# Patient Record
Sex: FEMALE
[De-identification: names, ages, dates, MRNs, and addresses within clinical notes are randomized per-mention and may not be internally consistent; named-entity substitution may affect disease eponyms.]

---

## 2021-03-15 ENCOUNTER — NURSE TRIAGE (OUTPATIENT)
Dept: OTHER | Facility: CLINIC | Age: 61
End: 2021-03-15

## 2021-03-15 NOTE — TELEPHONE ENCOUNTER
Reason for Disposition   COVID-19 vaccine, questions about   COVID-19 vaccine, Frequently Asked Questions (FAQs)    Answer Assessment - Initial Assessment Questions  1. COVID-19 CLOSE CONTACT: \"Who is the person with the confirmed or suspected COVID-19 infection that you were exposed to?\"      Daughter and grandchild    2. PLACE of CONTACT: \"Where were you when you were exposed to COVID-19? \" (e.g., home, school, medical waiting room; which city?)      Home    3. TYPE of CONTACT: \"How much contact was there? \" (e.g., sitting next to, live in same house, work in same office, same building)      Live in same house    4. DURATION of CONTACT: \"How long were you in contact with the COVID-19 patient? \" (e.g., a few seconds, passed by person, a few minutes, 15 minutes or longer, live with the patient)      Lives with    5. MASK: \"Were you wearing a mask? \" \"Was the other person wearing a mask? \" Note: wearing a mask reduces the risk of an otherwise close contact. No    6. DATE of CONTACT: \"When did you have contact with a COVID-19 patient? \" (e.g., how many days ago)     3/12    7. COMMUNITY SPREAD: \"Are there lots of cases of COVID-19 (community spread) where you live? \" (See public health department website, if unsure)        NA    8. SYMPTOMS: \"Do you have any symptoms? \" (e.g., fever, cough, breathing difficulty, loss of taste or smell)      None    9. PREGNANCY OR POSTPARTUM: \"Is there any chance you are pregnant? \" \"When was your last menstrual period? \" \"Did you deliver in the last 2 weeks? \"      No    10. HIGH RISK: \"Do you have any heart or lung problems? Do you have a weak immune system? \" (e.g., heart failure, COPD, asthma, HIV positive, chemotherapy, renal failure, diabetes mellitus, sickle cell anemia, obesity)        Yes    11. TRAVEL: \"Have you traveled out of the country recently? \" If so, \"When and where? \" Also ask about out-of-state travel, since the CDC has identified some high-risk cities for community spread in the [place] Note: Travel becomes less relevant if there is widespread community transmission where the patient lives. NA    Protocols used: CORONAVIRUS (COVID-19) EXPOSURE-ADULT-, CORONAVIRUS (COVID-19) VACCINE QUESTIONS AND REACTIONS-ADULT-AH    Caller is asking if she was exposed to Covid can she still get the vaccine. Informed caller she can get vaccine as long as she is symptom free at time of vaccine.

## 2022-03-18 PROBLEM — K65.1 INTRA-ABDOMINAL ABSCESS (HCC): Status: ACTIVE | Noted: 2020-03-20

## 2022-03-19 PROBLEM — R19.02 ABDOMINAL MASS, LEFT UPPER QUADRANT: Status: ACTIVE | Noted: 2020-03-09

## 2022-03-20 PROBLEM — C55 UTERINE CANCER (HCC): Status: ACTIVE | Noted: 2020-03-09

## 2023-03-20 PROBLEM — N17.9 ACUTE RENAL FAILURE (ARF) (HCC): Status: ACTIVE | Noted: 2023-03-20

## 2023-03-25 PROBLEM — B96.20 E-COLI UTI: Status: ACTIVE | Noted: 2023-03-25

## 2023-03-25 PROBLEM — N39.0 E-COLI UTI: Status: ACTIVE | Noted: 2023-03-25

## 2023-03-25 PROBLEM — N17.9 ACUTE RENAL FAILURE (ARF) (HCC): Status: RESOLVED | Noted: 2023-03-20 | Resolved: 2023-03-25

## 2023-06-13 PROBLEM — N10 ACUTE PYELONEPHRITIS: Status: ACTIVE | Noted: 2023-06-13

## 2023-06-21 PROBLEM — E11.9 DIABETES (HCC): Status: ACTIVE | Noted: 2023-06-21

## 2023-06-21 PROBLEM — I10 HYPERTENSION: Status: ACTIVE | Noted: 2023-06-21

## 2023-07-14 PROBLEM — E11.9 CONTROLLED TYPE 2 DIABETES MELLITUS WITHOUT COMPLICATION, WITHOUT LONG-TERM CURRENT USE OF INSULIN (HCC): Status: ACTIVE | Noted: 2018-02-14

## 2023-07-14 PROBLEM — R07.9 CHEST PAIN: Status: ACTIVE | Noted: 2022-11-11
